# Patient Record
Sex: MALE | Race: WHITE | URBAN - METROPOLITAN AREA
[De-identification: names, ages, dates, MRNs, and addresses within clinical notes are randomized per-mention and may not be internally consistent; named-entity substitution may affect disease eponyms.]

---

## 2022-04-11 ENCOUNTER — HISTORICAL (OUTPATIENT)
Dept: ADMINISTRATIVE | Facility: HOSPITAL | Age: 32
End: 2022-04-11

## 2022-04-29 VITALS
OXYGEN SATURATION: 98 % | DIASTOLIC BLOOD PRESSURE: 86 MMHG | WEIGHT: 212.94 LBS | BODY MASS INDEX: 28.84 KG/M2 | HEIGHT: 72 IN | SYSTOLIC BLOOD PRESSURE: 134 MMHG

## 2024-06-24 ENCOUNTER — HOSPITAL ENCOUNTER (OUTPATIENT)
Dept: RADIOLOGY | Facility: HOSPITAL | Age: 34
Discharge: HOME | End: 2024-06-24
Attending: ORTHOPAEDIC SURGERY
Payer: COMMERCIAL

## 2024-06-24 ENCOUNTER — OFFICE VISIT (OUTPATIENT)
Dept: SPORTS MEDICINE | Facility: CLINIC | Age: 34
End: 2024-06-24
Payer: COMMERCIAL

## 2024-06-24 VITALS — WEIGHT: 212.94 LBS | HEIGHT: 72 IN | BODY MASS INDEX: 28.84 KG/M2

## 2024-06-24 DIAGNOSIS — S76.302A LEFT HAMSTRING INJURY, INITIAL ENCOUNTER: Primary | ICD-10-CM

## 2024-06-24 DIAGNOSIS — S76.302A LEFT HAMSTRING INJURY, INITIAL ENCOUNTER: ICD-10-CM

## 2024-06-24 PROCEDURE — 73718 MRI LOWER EXTREMITY W/O DYE: CPT | Mod: 26,LT,, | Performed by: RADIOLOGY

## 2024-06-24 PROCEDURE — 73562 X-RAY EXAM OF KNEE 3: CPT | Mod: 26,59,RT, | Performed by: RADIOLOGY

## 2024-06-24 PROCEDURE — 73552 X-RAY EXAM OF FEMUR 2/>: CPT | Mod: 26,LT,, | Performed by: RADIOLOGY

## 2024-06-24 PROCEDURE — 73564 X-RAY EXAM KNEE 4 OR MORE: CPT | Mod: 26,LT,, | Performed by: RADIOLOGY

## 2024-06-24 PROCEDURE — 73552 X-RAY EXAM OF FEMUR 2/>: CPT | Mod: TC,PN,LT

## 2024-06-24 PROCEDURE — 99214 OFFICE O/P EST MOD 30 MIN: CPT | Mod: S$GLB,,, | Performed by: ORTHOPAEDIC SURGERY

## 2024-06-24 PROCEDURE — 73721 MRI JNT OF LWR EXTRE W/O DYE: CPT | Mod: 26,LT,, | Performed by: RADIOLOGY

## 2024-06-24 PROCEDURE — 99999 PR PBB SHADOW E&M-EST. PATIENT-LVL IV: CPT | Mod: PBBFAC,,, | Performed by: ORTHOPAEDIC SURGERY

## 2024-06-24 PROCEDURE — 1159F MED LIST DOCD IN RCRD: CPT | Mod: CPTII,S$GLB,, | Performed by: ORTHOPAEDIC SURGERY

## 2024-06-24 PROCEDURE — 73721 MRI JNT OF LWR EXTRE W/O DYE: CPT | Mod: TC,PO,LT

## 2024-06-24 PROCEDURE — 3008F BODY MASS INDEX DOCD: CPT | Mod: CPTII,S$GLB,, | Performed by: ORTHOPAEDIC SURGERY

## 2024-06-24 PROCEDURE — 73718 MRI LOWER EXTREMITY W/O DYE: CPT | Mod: TC,PO,LT

## 2024-06-24 PROCEDURE — 73564 X-RAY EXAM KNEE 4 OR MORE: CPT | Mod: TC,PN,LT

## 2024-06-24 NOTE — PROGRESS NOTES
Patient ID: Stan Argueta  YOB: 1990  MRN: [id number]    Chief Complaint: Pain and Injury of the Left Knee    Referred By: Elie Godoy    History of Present Illness: Stan Argueta is a  33 y.o. 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 He reports last week he was having some soreness in his hamstring but didn't think anything of it. On Saturday 6/22/24 he was playing in a softball game when he rounded 3rd base and felt a pop in his knee. He denies any instability. He was able to partially continue to play but had a pinch runner the rest of the game. He does have a hx of a hamstring strain 4 years ago. He presents for further treatment.     HPI    Past Medical History:   History reviewed. No pertinent past medical history.  History reviewed. No pertinent surgical history.  No family history on file.  Social History     Socioeconomic History    Marital status:      Social Determinants of Health      Received from [facility]    Transportation Needs     Medication List with Changes/Refills   Current Medications    DEXBROMPHENIRAMINE-PHENYLEPH 2-7.5 MG TAB    Take 1 tablet by mouth every 6 to 8 hours as needed.     Review of patient's allergies indicates:   Allergen Reactions    Shrimp Swelling     Throat swelling    Penicillins      hives     ROS    Physical Exam:   Body mass index is 28.88 kg/m².  There were no vitals filed for this visit.   GENERAL: Well appearing, appropriate for stated age, no acute distress.  CARDIOVASCULAR: Pulses regular by peripheral palpation.  PULMONARY: Respirations are even and non-labored.  NEURO: Awake, alert, and oriented x 3.  PSYCH: Mood & affect are appropriate.  HEENT: Head is normocephalic and atraumatic.  Ortho/SPM Exam  Deep bruising along biceps femoris tendon insertion  Loss of  contour of biceps femoris tendon (distal)    Knee flexion 2/5- unable to flex more than 30 degrees active     Ligamentous exam unremarkable   No instability of proximal tib-fib joint    Intact EHL, FHL, gastrocsoleus, and tibialis anterior. Sensation intact to light touch in superficial peroneal, deep peroneal, tibial, sural, and saphenous nerve distributions. Foot warm and well perfused with capillary refill of less than 2 seconds and palpable pedal pulses.      Imaging:    MRI Femur Without Contrast Left  Narrative: EXAM:  MRI FEMUR WITHOUT CONTRAST LEFT    CLINICAL HISTORY:  Unspecified injury of muscle, fascia and tendon of the posterior muscle group at thigh level, left thigh; initial encounter.    TECHNIQUE: MR left femur performed with multiplanar multisequence imaging.    COMPARISON STUDY:   Left femur x-ray in left knee x-ray 06/24/2024.    FINDINGS: There is a grade 2 strain injury biceps femoris muscle with low-grade partial-thickness tearing at the distal musculotendinous junction long head with moderate soft tissue swelling including intermuscular and intramuscular and subcutaneous distribution.  There is no biceps tendon disruption.    The regional musculature is otherwise unremarkable.    The femur is normal.  Impression:  Grade 2 strain injury biceps femoris muscle, low-grade partial-thickness tearing long head biceps distal musculotendinous junction with moderate soft tissue swelling.    Finalized on: 6/24/2024 3:45 PM By:  Erasmo Alonzo MD  [facility]# [id number]      2024-06-24 15:48:05.[facility]    [facility]  MRI Knee Without Contrast Left  Narrative: EXAM:  MRI KNEE WITHOUT CONTRAST LEFT    CLINICAL INDICATION: Unspecified injury of muscle, fascia and tendon of the posterior muscle group at thigh level, left thigh; initial encounter.    TECHNIQUE: MR left knee performed with multiplanar multisequence imaging.    COMPARISON STUDY:  None.    FINDINGS: There is a grade 2 strain injury biceps femoris muscle with  low-grade partial-thickness tearing at the musculotendinous junction long head with moderate soft tissue swelling including intermuscular and intramuscular and subcutaneous distribution.  There is no biceps tendon disruption.    The regional musculature is otherwise unremarkable.    There is no internal derangement with preservation of signal intensity and morphology of the menisci, collateral ligaments, and cruciate ligaments.    Extensor mechanism is intact.  There is no knee joint effusion.    The chondral surface are well-preserved.    The bone marrow signal intensity is normal.  Impression:  Grade 2 strain injury biceps femoris muscle, low-grade partial-thickness tearing long head biceps musculotendinous junction with moderate soft tissue swelling.    Finalized on: 6/24/2024 3:44 PM By:  Erasmo Alonzo MD  [facility]# [id number]      2024-06-24 15:46:55.395    [facility]  X-Ray Femur 2 View Left  Narrative: EXAM: XR FEMUR 2 VIEW LEFT    CLINICAL HISTORY: Unspecified injury of muscle, fascia and tendon of the posterior muscle group at thigh level, left thigh, initial encounter    COMPARISON: None    TECHNIQUE: 5 views of the left femur    FINDINGS: No evidence of fracture.  No lytic or blastic lesions.  Joint alignment is anatomic.  No radiopaque foreign body.  Impression:   No acute radiographic abnormality of the left femur.    Finalized on: 6/24/2024 9:20 AM By:  Oracio Quarles MD  [facility]# [id number]      2024-06-24 09:22:22.513    [facility]  X-ray Knee Ortho Left with Flexion  Narrative: EXAM:  XR KNEE ORTHO LEFT WITH FLEXION    CLINICAL HISTORY: Unspecified injury of muscle, fascia and tendon of posterior muscle group at thigh level, left thigh, initial encounter    COMPARISON: None    TECHNIQUE: Standing, standing PA flexion and sunrise views of both knees with a lateral view of the left knee    FINDINGS:  No acute fracture.  Joint alignment is anatomic.  The joint spaces are maintained bilaterally.  No significant joint effusion.   No radiopaque foreign body.  Impression:  No acute radiographic abnormality.    Finalized on: 6/24/2024 9:18 AM By:  Oracio Quarles MD  [facility]# [id number]      2024-06-24 09:21:02.437    [facility]    Relevant imaging results reviewed and interpreted by me, discussed with the patient and / or family today.     Other Tests:         Patient Instructions   Assessment:  Stan Argueta is a  33 y.o. 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    Left hamstring injury, initial encounter Yes      Plan:  Will order STAT MRI to assess soft tissue damage  Depending on results will tell us if it needs surgery or not.   Regarding travel, new injury like this will increase risk of blood clot  Recommend Tylenol and compression sleeve to help reduce risk  Will call with results tonight    Follow-up: after Imaging or sooner if there are any problems between now and then.    Leave Review:   Google: Leave Google Review  Healthgrades: Leave Healthgrades Review    After Hours Number: [phone]       Provider Note/Medical Decision Making: Addendum - read the MRI same day after it was completed and reviewed with two of my colleagues. I do think there is a high grade tear of one head of the biceps femoris. Discussed with my colleagues and patient operative and non-operative options. He prefers non-operative.    I discussed worrisome and red flag signs and symptoms with the patient. The patient expressed understanding and agreed to alert me immediately or to go to the emergency room if they experience any of these.   Treatment plan was developed with input from the patient/family, and they expressed understanding and agreement with the plan. All questions were answered today.          Yeison Mejia MD  Orthopaedic Surgery & Sports Medicine       Disclaimer: This note was prepared using a voice recognition system and  is likely to have sound alike errors within the text.     I, Stan Alvarez, acted as a scribe for Yeison Mejia MD for the duration of this office visit.

## 2024-06-24 NOTE — PATIENT INSTRUCTIONS
Assessment:  Stan Argueta is a  33 y.o. 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    Left hamstring injury, initial encounter Yes      Plan:  Will order STAT MRI to assess soft tissue damage  Depending on results will tell us if it needs surgery or not.   Regarding travel, new injury like this will increase risk of blood clot  Recommend Tylenol and compression sleeve to help reduce risk  Will call with results tonight    Follow-up: after Imaging or sooner if there are any problems between now and then.    Leave Review:   Google: Leave Google Review  Healthgrades: Leave Healthgrades Review    After Hours Number: [phone]